# Patient Record
Sex: MALE
[De-identification: names, ages, dates, MRNs, and addresses within clinical notes are randomized per-mention and may not be internally consistent; named-entity substitution may affect disease eponyms.]

---

## 2017-01-01 ENCOUNTER — HOSPITAL (OUTPATIENT)
Dept: OTHER | Age: 37
End: 2017-01-01
Attending: NURSE PRACTITIONER

## 2019-01-08 PROCEDURE — 80307 DRUG TEST PRSMV CHEM ANLYZR: CPT | Performed by: INTERNAL MEDICINE

## 2019-01-08 PROCEDURE — 80353 DRUG SCREENING COCAINE: CPT | Performed by: INTERNAL MEDICINE

## 2019-06-13 PROBLEM — N46.11 OLIGOSPERMIA: Status: ACTIVE | Noted: 2018-06-26

## 2019-06-13 PROBLEM — K21.00 GERD WITH ESOPHAGITIS: Status: ACTIVE | Noted: 2019-06-13

## 2019-06-13 PROBLEM — E29.1 TESTICULAR FAILURE: Status: ACTIVE | Noted: 2018-06-26

## 2019-07-11 PROBLEM — Z72.0 TOBACCO USE: Status: ACTIVE | Noted: 2019-07-11

## 2019-11-18 ENCOUNTER — HOSPITAL ENCOUNTER (OUTPATIENT)
Age: 39
Discharge: HOME | End: 2019-11-18
Attending: EMERGENCY MEDICINE
Payer: COMMERCIAL

## 2019-11-18 VITALS
HEIGHT: 73 IN | TEMPERATURE: 98 F | BODY MASS INDEX: 21.2 KG/M2 | OXYGEN SATURATION: 100 % | HEART RATE: 85 BPM | WEIGHT: 160 LBS | RESPIRATION RATE: 16 BRPM | SYSTOLIC BLOOD PRESSURE: 142 MMHG | DIASTOLIC BLOOD PRESSURE: 91 MMHG

## 2019-11-18 DIAGNOSIS — E86.0 DEHYDRATION: ICD-10-CM

## 2019-11-18 DIAGNOSIS — R11.10 VOMITING, INTRACTABILITY OF VOMITING NOT SPECIFIED, PRESENCE OF NAUSEA NOT SPECIFIED, UNSPECIFIED VOMITING TYPE: Primary | ICD-10-CM

## 2019-11-18 PROCEDURE — 96361 HYDRATE IV INFUSION ADD-ON: CPT

## 2019-11-18 PROCEDURE — 81002 URINALYSIS NONAUTO W/O SCOPE: CPT

## 2019-11-18 PROCEDURE — 85025 COMPLETE CBC W/AUTO DIFF WBC: CPT | Performed by: EMERGENCY MEDICINE

## 2019-11-18 PROCEDURE — 96374 THER/PROPH/DIAG INJ IV PUSH: CPT

## 2019-11-18 PROCEDURE — 99204 OFFICE O/P NEW MOD 45 MIN: CPT

## 2019-11-18 PROCEDURE — 80047 BASIC METABLC PNL IONIZED CA: CPT

## 2019-11-18 RX ORDER — SODIUM CHLORIDE 9 MG/ML
1000 INJECTION, SOLUTION INTRAVENOUS ONCE
Status: COMPLETED | OUTPATIENT
Start: 2019-11-18 | End: 2019-11-18

## 2019-11-18 RX ORDER — ONDANSETRON 2 MG/ML
4 INJECTION INTRAMUSCULAR; INTRAVENOUS ONCE
Status: COMPLETED | OUTPATIENT
Start: 2019-11-18 | End: 2019-11-18

## 2019-11-18 NOTE — ED PROVIDER NOTES
Patient Seen in: [address]      History   Patient presents with:  Dehydration (metabolic/constitutional)    Stated Complaint: dehydrated    HPI  Patient presents with father.   Patient had a laminectomy discectomy on 11/ • Syphilis (acquired) 12-14    treated with intramuscular penicillin- RPR and FTA were positive              Past Surgical History:   Procedure Laterality Date   • EXCIS LUMBAR DISK,ONE LEVEL     • OTHER SURGICAL HISTORY  4-07    S/P VATS with bleb removal Effort: Pulmonary effort is normal.      Breath sounds: Normal breath sounds. Abdominal:      General: Bowel sounds are normal. There is no distension. Palpations: Abdomen is soft. There is no mass. Tenderness: There is no tenderness.  There After hydration patient feels much better. Ambulatory to bathroom without difficulty. No abdominal pain or nausea. Tolerated 10 ounces Gatorade. No lightheadedness or dizziness.   Abdomen soft and nontender to exam.  Patient now reports having had 3 sep

## 2019-11-18 NOTE — ED INITIAL ASSESSMENT (HPI)
Patient reports he had back surgery no the 11/8. Patient reports he has not been able to keep anything down for a couple of days. Patient reports that he phoned his surgeon and was told to come here for IV fluids.

## 2019-11-19 PROBLEM — Z98.890: Status: ACTIVE | Noted: 2019-11-19

## 2021-03-04 PROBLEM — J93.83 SPONTANEOUS PNEUMOTHORAX: Status: ACTIVE | Noted: 2021-03-04

## 2021-03-24 PROBLEM — E55.9 VITAMIN D DEFICIENCY: Status: ACTIVE | Noted: 2021-03-24

## 2021-07-07 ENCOUNTER — HOSPITAL ENCOUNTER (OUTPATIENT)
Age: 41
Discharge: HOME | End: 2021-07-07
Payer: COMMERCIAL

## 2021-07-07 VITALS
HEART RATE: 86 BPM | DIASTOLIC BLOOD PRESSURE: 99 MMHG | TEMPERATURE: 98 F | RESPIRATION RATE: 16 BRPM | OXYGEN SATURATION: 99 % | SYSTOLIC BLOOD PRESSURE: 138 MMHG

## 2021-07-07 DIAGNOSIS — H66.90 ACUTE OTITIS MEDIA, UNSPECIFIED OTITIS MEDIA TYPE: Primary | ICD-10-CM

## 2021-07-07 PROCEDURE — 99213 OFFICE O/P EST LOW 20 MIN: CPT | Performed by: NURSE PRACTITIONER

## 2021-07-07 RX ORDER — AMOXICILLIN 875 MG/1
875 TABLET, COATED ORAL 2 TIMES DAILY
Qty: 20 TABLET | Refills: 0 | Status: SHIPPED | OUTPATIENT
Start: 2021-07-07 | End: 2021-07-17

## 2021-07-07 NOTE — ED PROVIDER NOTES
Patient Seen in: [facility]    History   Patient presents with:  Ear Problem Pain    Stated Complaint: ear ache    HPI    67-year-old male with no significant history here with complaints of left ear pain that started over the last couple of Years since quittin.5      Smokeless tobacco: Never Used      Tobacco comment: most 1.5 packs a day, averaged 2 packs a week     Alcohol use:  Yes      Alcohol/week: 5.0 standard drinks      Types: 5 Standard drinks or equivalent per week      Comme coronavirus 19.   [employer] [place] has issued an official mandate providing medical malpractice immunity and civil liability immunity to office issues of treating patients during the pandemic  MDM         I have personally  reviewed available prior medical 0

## 2022-07-22 ENCOUNTER — HOSPITAL ENCOUNTER (OUTPATIENT)
Age: 42
Discharge: HOME | End: 2022-07-22
Payer: COMMERCIAL

## 2022-07-22 VITALS
RESPIRATION RATE: 18 BRPM | HEART RATE: 60 BPM | OXYGEN SATURATION: 100 % | DIASTOLIC BLOOD PRESSURE: 90 MMHG | SYSTOLIC BLOOD PRESSURE: 137 MMHG | TEMPERATURE: 99 F

## 2022-07-22 DIAGNOSIS — R11.2 NAUSEA AND VOMITING IN ADULT: Primary | ICD-10-CM

## 2022-07-22 LAB
#MXD IC: 1.3 X10ˆ3/UL (ref 0.1–1)
BUN BLD-MCNC: 5 MG/DL (ref 7–18)
CHLORIDE BLD-SCNC: 99 MMOL/L (ref 98–112)
CO2 BLD-SCNC: 25 MMOL/L (ref 21–32)
CREAT BLD-MCNC: 0.9 MG/DL
GLUCOSE BLD-MCNC: 114 MG/DL (ref 70–99)
HCT VFR BLD AUTO: 40.3 %
HCT VFR BLD CALC: 44 %
HGB BLD-MCNC: 14 G/DL
ISTAT IONIZED CALCIUM FOR CHEM 8: 1.2 MMOL/L (ref 1.12–1.32)
LYMPHOCYTES # BLD AUTO: 1.5 X10ˆ3/UL (ref 1–4)
LYMPHOCYTES NFR BLD AUTO: 10.8 %
MCH RBC QN AUTO: 32.8 PG (ref 26–34)
MCHC RBC AUTO-ENTMCNC: 34.7 G/DL (ref 31–37)
MCV RBC AUTO: 94.4 FL (ref 80–100)
MIXED CELL %: 9.6 %
NEUTROPHILS # BLD AUTO: 10.7 X10ˆ3/UL (ref 1.5–7.7)
NEUTROPHILS NFR BLD AUTO: 79.6 %
PLATELET # BLD AUTO: 259 X10ˆ3/UL (ref 150–450)
POTASSIUM BLD-SCNC: 3.4 MMOL/L (ref 3.6–5.1)
RBC # BLD AUTO: 4.27 X10ˆ6/UL
SODIUM BLD-SCNC: 137 MMOL/L (ref 136–145)
WBC # BLD AUTO: 13.5 X10ˆ3/UL (ref 4–11)

## 2022-07-22 PROCEDURE — 99203 OFFICE O/P NEW LOW 30 MIN: CPT | Performed by: PHYSICIAN ASSISTANT

## 2022-07-22 PROCEDURE — 96374 THER/PROPH/DIAG INJ IV PUSH: CPT | Performed by: PHYSICIAN ASSISTANT

## 2022-07-22 PROCEDURE — 85025 COMPLETE CBC W/AUTO DIFF WBC: CPT | Performed by: PHYSICIAN ASSISTANT

## 2022-07-22 PROCEDURE — 80047 BASIC METABLC PNL IONIZED CA: CPT | Performed by: PHYSICIAN ASSISTANT

## 2022-07-22 RX ORDER — 0.9 % SODIUM CHLORIDE 0.9 %
1000 INTRAVENOUS SOLUTION INTRAVENOUS ONCE
Status: COMPLETED | OUTPATIENT
Start: 2022-07-22 | End: 2022-07-22

## 2022-07-22 RX ORDER — ONDANSETRON 4 MG/1
4 TABLET, ORALLY DISINTEGRATING ORAL EVERY 4 HOURS PRN
Qty: 10 TABLET | Refills: 0 | Status: SHIPPED | OUTPATIENT
Start: 2022-07-22 | End: 2022-07-29

## 2022-07-22 RX ORDER — ONDANSETRON 2 MG/ML
4 INJECTION INTRAMUSCULAR; INTRAVENOUS ONCE
Status: COMPLETED | OUTPATIENT
Start: 2022-07-22 | End: 2022-07-22

## 2022-11-11 ENCOUNTER — HOSPITAL ENCOUNTER (OUTPATIENT)
Age: 42
Discharge: HOME | End: 2022-11-11
Payer: COMMERCIAL

## 2022-11-11 VITALS
SYSTOLIC BLOOD PRESSURE: 139 MMHG | OXYGEN SATURATION: 98 % | DIASTOLIC BLOOD PRESSURE: 94 MMHG | HEART RATE: 88 BPM | WEIGHT: 160 LBS | HEIGHT: 73 IN | BODY MASS INDEX: 21.2 KG/M2 | RESPIRATION RATE: 16 BRPM | TEMPERATURE: 98 F

## 2022-11-11 DIAGNOSIS — R19.7 NAUSEA VOMITING AND DIARRHEA: Primary | ICD-10-CM

## 2022-11-11 DIAGNOSIS — R11.2 NAUSEA VOMITING AND DIARRHEA: Primary | ICD-10-CM

## 2022-11-11 LAB
#MXD IC: 1 X10ˆ3/UL (ref 0.1–1)
BUN BLD-MCNC: 6 MG/DL (ref 7–18)
CHLORIDE BLD-SCNC: 101 MMOL/L (ref 98–112)
CO2 BLD-SCNC: 27 MMOL/L (ref 21–32)
CREAT BLD-MCNC: 1.6 MG/DL
GFR SERPLBLD BASED ON 1.73 SQ M-ARVRAT: 55 ML/MIN/1.73M2 (ref 60–?)
GLUCOSE BLD-MCNC: 120 MG/DL (ref 70–99)
HCT VFR BLD AUTO: 43 %
HCT VFR BLD CALC: 50 %
HGB BLD-MCNC: 14.5 G/DL
ISTAT IONIZED CALCIUM FOR CHEM 8: 1.05 MMOL/L (ref 1.12–1.32)
LYMPHOCYTES # BLD AUTO: 1.3 X10ˆ3/UL (ref 1–4)
LYMPHOCYTES NFR BLD AUTO: 22.1 %
MCH RBC QN AUTO: 32.3 PG (ref 26–34)
MCHC RBC AUTO-ENTMCNC: 33.7 G/DL (ref 31–37)
MCV RBC AUTO: 95.8 FL (ref 80–100)
MIXED CELL %: 17.4 %
NEUTROPHILS # BLD AUTO: 3.5 X10ˆ3/UL (ref 1.5–7.7)
NEUTROPHILS NFR BLD AUTO: 60.5 %
PLATELET # BLD AUTO: 191 X10ˆ3/UL (ref 150–450)
POTASSIUM BLD-SCNC: 3.2 MMOL/L (ref 3.6–5.1)
RBC # BLD AUTO: 4.49 X10ˆ6/UL
SODIUM BLD-SCNC: 140 MMOL/L (ref 136–145)
WBC # BLD AUTO: 5.8 X10ˆ3/UL (ref 4–11)

## 2022-11-11 RX ORDER — ONDANSETRON 2 MG/ML
4 INJECTION INTRAMUSCULAR; INTRAVENOUS ONCE
Status: COMPLETED | OUTPATIENT
Start: 2022-11-11 | End: 2022-11-11

## 2022-11-11 RX ORDER — POTASSIUM CHLORIDE 20 MEQ/1
40 TABLET, EXTENDED RELEASE ORAL ONCE
Status: DISCONTINUED | OUTPATIENT
Start: 2022-11-11 | End: 2022-11-11

## 2022-11-11 RX ORDER — POTASSIUM CHLORIDE 20 MEQ/1
40 TABLET, EXTENDED RELEASE ORAL DAILY
Status: DISCONTINUED | OUTPATIENT
Start: 2022-11-11 | End: 2022-11-11

## 2022-11-11 RX ORDER — ONDANSETRON 4 MG/1
4 TABLET, ORALLY DISINTEGRATING ORAL EVERY 8 HOURS PRN
Qty: 10 TABLET | Refills: 0 | Status: SHIPPED | OUTPATIENT
Start: 2022-11-11 | End: 2022-11-18

## 2022-11-11 RX ORDER — SODIUM CHLORIDE 9 MG/ML
1000 INJECTION, SOLUTION INTRAVENOUS ONCE
Status: COMPLETED | OUTPATIENT
Start: 2022-11-11 | End: 2022-11-11

## 2022-11-11 NOTE — ED INITIAL ASSESSMENT (HPI)
LILIANA Vasquez at bedside assessing. Patient here for evaluation of a headache that started 11/8, dizziness and lightheadedness started 11/9, and yesterday started with N/V/D. Denies any CP, SOB/VIANNEY, abdominal pain, etc. Last time he was able to keep any solids or liquids down was on 11/8.

## 2022-11-11 NOTE — DISCHARGE INSTRUCTIONS
Drink plenty of fluids. Rest.  Avoid dairy products for the next couple days. Make a close follow-up appoint with your doctor, you should have your kidney function rechecked with your feeling better. For any worsening symptoms, go to the nearest emergency department for further evaluation.

## 2022-11-11 NOTE — ED QUICK NOTES
Patient tolerated the claudio grahams, gatorade, and potassium without any problems. States feels better after fluids and denies any further nausea.

## 2024-06-25 ENCOUNTER — HOSPITAL ENCOUNTER (OUTPATIENT)
Age: 44
Discharge: HOME | End: 2024-06-25

## 2024-06-25 VITALS
HEART RATE: 97 BPM | DIASTOLIC BLOOD PRESSURE: 87 MMHG | TEMPERATURE: 98 F | SYSTOLIC BLOOD PRESSURE: 113 MMHG | OXYGEN SATURATION: 97 % | RESPIRATION RATE: 16 BRPM

## 2024-06-25 DIAGNOSIS — T15.91XA FOREIGN BODY OF RIGHT EYE, INITIAL ENCOUNTER: Primary | ICD-10-CM

## 2024-06-25 PROCEDURE — 99213 OFFICE O/P EST LOW 20 MIN: CPT | Performed by: NURSE PRACTITIONER

## 2024-06-25 RX ORDER — HYDROXYZINE PAMOATE 25 MG/1
25 CAPSULE ORAL DAILY PRN
COMMUNITY
Start: 2024-06-10

## 2024-06-25 RX ORDER — TOBRAMYCIN 3 MG/ML
2 SOLUTION/ DROPS OPHTHALMIC EVERY 6 HOURS
Qty: 1 EACH | Refills: 0 | Status: SHIPPED | OUTPATIENT
Start: 2024-06-25 | End: 2024-07-02

## 2024-06-25 RX ORDER — METHYLPHENIDATE HYDROCHLORIDE 18 MG/1
1 TABLET, EXTENDED RELEASE ORAL DAILY
COMMUNITY
Start: 2024-05-31

## 2024-06-25 RX ORDER — GABAPENTIN 300 MG/1
300 CAPSULE ORAL 3 TIMES DAILY
COMMUNITY
Start: 2024-06-04

## 2024-06-25 RX ORDER — AMLODIPINE BESYLATE 5 MG/1
5 TABLET ORAL DAILY
COMMUNITY
Start: 2023-03-13

## 2024-06-25 RX ORDER — TRAZODONE HYDROCHLORIDE 100 MG/1
100 TABLET ORAL NIGHTLY
COMMUNITY
Start: 2024-04-10

## 2024-06-25 RX ORDER — ACAMPROSATE CALCIUM 333 MG/1
666 TABLET, DELAYED RELEASE ORAL 3 TIMES DAILY
COMMUNITY

## 2024-06-25 NOTE — DISCHARGE INSTRUCTIONS
Avoid wearing contact lenses until you follow-up with your ophthalmologist or the ophthalmologist referred to you.  Use the eyedrops as prescribed.  Return for any concerns.
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
## 2024-08-10 ENCOUNTER — HOSPITAL ENCOUNTER (OUTPATIENT)
Age: 44
Discharge: HOME | End: 2024-08-10
Payer: COMMERCIAL

## 2024-08-10 VITALS
WEIGHT: 165 LBS | TEMPERATURE: 99 F | DIASTOLIC BLOOD PRESSURE: 102 MMHG | OXYGEN SATURATION: 98 % | RESPIRATION RATE: 18 BRPM | HEIGHT: 73 IN | BODY MASS INDEX: 21.87 KG/M2 | HEART RATE: 110 BPM | SYSTOLIC BLOOD PRESSURE: 147 MMHG

## 2024-08-10 DIAGNOSIS — R03.0 ELEVATED BLOOD PRESSURE READING: ICD-10-CM

## 2024-08-10 DIAGNOSIS — J01.90 ACUTE NON-RECURRENT SINUSITIS, UNSPECIFIED LOCATION: Primary | ICD-10-CM

## 2024-08-10 LAB
#MXD IC: 1.4 X10ˆ3/UL (ref 0.1–1)
BASOPHILS # BLD AUTO: 0.08 X10(3) UL (ref 0–0.2)
BASOPHILS NFR BLD AUTO: 0.8 %
BUN BLD-MCNC: 13 MG/DL (ref 7–18)
CHLORIDE BLD-SCNC: 95 MMOL/L (ref 98–112)
CO2 BLD-SCNC: 28 MMOL/L (ref 21–32)
CREAT BLD-MCNC: 1.3 MG/DL
DEPRECATED RDW RBC AUTO: 51 FL (ref 35.1–46.3)
EGFRCR SERPLBLD CKD-EPI 2021: 70 ML/MIN/1.73M2 (ref 60–?)
EOSINOPHIL # BLD AUTO: 0.48 X10(3) UL (ref 0–0.7)
EOSINOPHIL NFR BLD AUTO: 5 %
ERYTHROCYTE [DISTWIDTH] IN BLOOD BY AUTOMATED COUNT: 14.4 % (ref 11–15)
GLUCOSE BLD-MCNC: 112 MG/DL (ref 70–99)
HCT VFR BLD AUTO: 47 %
HCT VFR BLD AUTO: 47.9 %
HCT VFR BLD CALC: 52 %
HGB BLD-MCNC: 15.6 G/DL
HGB BLD-MCNC: 16 G/DL
IMM GRANULOCYTES # BLD AUTO: 0.03 X10(3) UL (ref 0–1)
IMM GRANULOCYTES NFR BLD: 0.3 %
ISTAT IONIZED CALCIUM FOR CHEM 8: 1.22 MMOL/L (ref 1.12–1.32)
LYMPHOCYTES # BLD AUTO: 1.4 X10ˆ3/UL (ref 1–4)
LYMPHOCYTES # BLD AUTO: 1.42 X10(3) UL (ref 1–4)
LYMPHOCYTES NFR BLD AUTO: 14 %
LYMPHOCYTES NFR BLD AUTO: 14.9 %
MCH RBC QN AUTO: 31.5 PG (ref 26–34)
MCH RBC QN AUTO: 32.9 PG (ref 26–34)
MCHC RBC AUTO-ENTMCNC: 32.6 G/DL (ref 31–37)
MCHC RBC AUTO-ENTMCNC: 34 G/DL (ref 31–37)
MCV RBC AUTO: 96.5 FL
MCV RBC AUTO: 96.8 FL (ref 80–100)
MIXED CELL %: 14.1 %
MONOCYTES # BLD AUTO: 1.26 X10(3) UL (ref 0.1–1)
MONOCYTES NFR BLD AUTO: 13.2 %
NEUTROPHILS # BLD AUTO: 6.27 X10 (3) UL (ref 1.5–7.7)
NEUTROPHILS # BLD AUTO: 6.27 X10(3) UL (ref 1.5–7.7)
NEUTROPHILS # BLD AUTO: 6.9 X10ˆ3/UL (ref 1.5–7.7)
NEUTROPHILS NFR BLD AUTO: 65.8 %
NEUTROPHILS NFR BLD AUTO: 71.9 %
PLATELET # BLD AUTO: 207 10(3)UL (ref 150–450)
POTASSIUM BLD-SCNC: 3.6 MMOL/L (ref 3.6–5.1)
RBC # BLD AUTO: 4.87 X10(6)UL
RBC # BLD AUTO: 4.95 X10ˆ6/UL
SARS-COV-2 RNA RESP QL NAA+PROBE: NOT DETECTED
SODIUM BLD-SCNC: 137 MMOL/L (ref 136–145)
WBC # BLD AUTO: 9.5 X10(3) UL (ref 4–11)
WBC # BLD AUTO: 9.7 X10ˆ3/UL (ref 4–11)

## 2024-08-10 PROCEDURE — 99214 OFFICE O/P EST MOD 30 MIN: CPT | Performed by: NURSE PRACTITIONER

## 2024-08-10 PROCEDURE — 96360 HYDRATION IV INFUSION INIT: CPT | Performed by: NURSE PRACTITIONER

## 2024-08-10 PROCEDURE — 80047 BASIC METABLC PNL IONIZED CA: CPT | Performed by: NURSE PRACTITIONER

## 2024-08-10 PROCEDURE — U0002 COVID-19 LAB TEST NON-CDC: HCPCS | Performed by: NURSE PRACTITIONER

## 2024-08-10 PROCEDURE — 85025 COMPLETE CBC W/AUTO DIFF WBC: CPT | Performed by: NURSE PRACTITIONER

## 2024-08-10 RX ORDER — AMOXICILLIN AND CLAVULANATE POTASSIUM 875; 125 MG/1; MG/1
1 TABLET, FILM COATED ORAL 2 TIMES DAILY
Qty: 14 TABLET | Refills: 0 | Status: SHIPPED | OUTPATIENT
Start: 2024-08-10 | End: 2024-08-17

## 2024-08-10 RX ORDER — SODIUM CHLORIDE 9 MG/ML
1000 INJECTION, SOLUTION INTRAVENOUS ONCE
Status: COMPLETED | OUTPATIENT
Start: 2024-08-10 | End: 2024-08-10

## 2024-08-10 NOTE — ED INITIAL ASSESSMENT (HPI)
Pt presents to the IC with c/o sinus congestion and facial pressure with emesis following post nasal drip since Tuesday. Pt is seeing his ENT in Sept.

## 2024-08-10 NOTE — ED QUICK NOTES
700cc  NS infused. Pt requesting to leave as he wants to be home with his kids. IV discontinued.

## 2024-08-10 NOTE — ED PROVIDER NOTES
Patient Seen in: [facility]    History   CC: sinus pressure  HPI: Angel Brown 44 year old male w/ HTN, Asthma, Allergic Rhinitis, RICKEY who presents c/o sinus pressure/pain which first began 8/6/2024. Hx of sinus issues, recurrent sinusitis however has not had a sinus infection in a year since having sinus surgery. States he gets concerned however waiting too long for tx as his wife became bacteriemic from an untreated sinusitis. Advises copious pnd which has been causing him to vomit. States he has been unable to tolerate any PO x3 days. Denies abd pain, diarrhea, rash, fever, cp, maryam, difficulty swallowing/drooling. Is requesting IVF.      Past Medical History:    ALLERGIC RHINITIS    ASTHMA    Asthma (HCC)    Erectile dysfunction, unspecified erectile dysfunction type    Essential hypertension    GERD    History of hemilaminectomy    HOSPITALIZATIONS    Spontaneous pneumothorax x 2    OTHER DISEASES    Pulmonary Langerhan's histiocytosis    OTHER DISEASES    Insomnia    Spontaneous pneumothorax    Syphilis (acquired)    treated with intramuscular penicillin- RPR and FTA were positive    Vitamin D deficiency       Past Surgical History:   Procedure Laterality Date    Excis lumbar disk,one level      Other surgical history  4-07    S/P VATS with bleb removal       Family History   Problem Relation Age of Onset    Lipids Father     Hypertension Father     Hypertension Mother        Social History     Socioeconomic History    Marital status:     Number of children: 1   Occupational History    Occupation: Financial management   Tobacco Use    Smoking status: Former     Current packs/day: 0.00     Types: Cigarettes, Cigars     Start date: 1/1/2000     Quit date: 1/1/2014     Years since quitting: 10.6    Smokeless tobacco: Never    Tobacco comments:     most 1.5 packs a day, averaged 2 packs a week    Vaping Use    Vaping status: Never Used   Substance and Sexual Activity    Alcohol use: Not  Currently     Alcohol/week: 5.0 standard drinks of alcohol     Types: 5 Standard drinks or equivalent per week     Comment: occas    Drug use: Yes     Types: Cannabis     Comment: occ       ROS:  Review of Systems    Positive for stated complaint: congestion,vomiting  Other systems are as noted in HPI.  Constitutional and vital signs reviewed.      All other systems reviewed and negative except as noted above.    PSFH elements reviewed from today and agreed except as otherwise stated in HPI.             Constitutional and vital signs reviewed.        Physical Exam     ED Triage Vitals   BP 08/10/24 0811 (!) 147/102   Pulse 08/10/24 0811 110   Resp 08/10/24 0807 18   Temp 08/10/24 0811 98.9 °F (37.2 °C)   Temp src 08/10/24 0807 Temporal   SpO2 08/10/24 0811 98 %   O2 Device 08/10/24 0811 None (Room air)       Current:BP (!) 147/102   Pulse 110   Temp 98.9 °F (37.2 °C) (Oral)   Resp 18   Ht 185.4 cm (6' 1\")   Wt 74.8 kg   SpO2 98%   BMI 21.77 kg/m²         PE:  General - Appears well, non-toxic and in NAD  Head - Appears symmetrical without deformity/swelling cranium, scalp, or facial bones  Eyes - sclera not injected, no discharge noted, no periorbital edema  ENT - EAC bilaterally without discharge, TM pearly grey with COL visualized appropriately bilaterally.   no nasal drainage noted in nares bilat, no cobblestoning to post. Pharynx.   Oropharynx clear, posterior pharynx is without erythema and without tonsilar enlargement or exudate, uvula midline, +gag, voice is clear. No trismus  Neck - no significant adenopathy, supple with trachea midline  Resp - Lung sounds clear bilaterally and wob unlabored, good aeration with equal, even expansion bilaterally   CV - RRR  Skin - no rashes or petechiae noted, pink warm and dry throughout, mmm, cap refill <2seconds  Neuro - A&O x4, steady gait  MSK - makes purposeful movements of all extremities, radial pulses 2+ bilat.  Psych - Interactive and appropriate      ED  Course     Labs Reviewed   POCT CBC - Abnormal; Notable for the following components:       Result Value    # Mixed Cells 1.4 (*)     All other components within normal limits   POCT ISTAT CHEM8 CARTRIDGE - Abnormal; Notable for the following components:    ISTAT Chloride 95 (*)     ISTAT Glucose 112 (*)     All other components within normal limits   RAPID SARS-COV-2 BY PCR - Normal   CBC W AUTO DIFF       MDM     DDx: covid 19, unspecified viral illness, allergic rhinitis, viral v bacterial sinusitis     Patient advises he has not been able to hold down any p.o. fluids or solids due to postnasal drip inducing vomiting.  Patient denies abdominal pain.  No abdominal tenderness on exam.  He is requesting IV fluids.  CBC and chemistry with glucose 112 however patient has been drinking Gatorade this morning he states somewhat successfully and chloride 95.  1 L IV fluids given.  Rapid COVID PCR negative.  Advised hydration instructions, sinus rinses, Flonase nasal spray, rest, close follow-up and return/ED precautions reviewed.  Patient states some improvement after IV fluids in the immediate care.  He has been taking Sudafed and has not taken his blood pressure medication this morning.  He is aware his blood pressure is elevated at the immediate care and will take his blood pressure medicine when he gets home. Patient is historian and demonstrates understanding of all instruction and agrees with plan of care.    Patient's blood pressure elevated at immediate care.  Nonsymptomatic of high blood pressure.  Red flags reviewed with patient as well as close follow-up instructions.  Pt demonstrates understanding of instruction and agrees with plan of care.    Disposition and Plan     Clinical Impression:  1. Acute non-recurrent sinusitis, unspecified location    2. Elevated blood pressure reading        Disposition:  Discharge    Follow-up:  Livan Cleaning MD  [address] [facility] [address]  [facility]  [id number]  [phone]    Go in 1 week  As needed      Medications Prescribed:  Discharge Medication List as of 8/10/2024  9:30 AM        START taking these medications    Details   amoxicillin clavulanate 875-125 MG Oral Tab Take 1 tablet by mouth 2 (two) times daily for 7 days., Normal, Disp-14 tablet, R-0

## 2025-04-28 ENCOUNTER — HOSPITAL ENCOUNTER (OUTPATIENT)
Age: 45
Discharge: ANOTHER HOSPITAL | End: 2025-04-28
Payer: COMMERCIAL

## 2025-04-28 VITALS
TEMPERATURE: 99 F | HEART RATE: 117 BPM | OXYGEN SATURATION: 100 % | SYSTOLIC BLOOD PRESSURE: 83 MMHG | RESPIRATION RATE: 22 BRPM | DIASTOLIC BLOOD PRESSURE: 53 MMHG

## 2025-04-28 DIAGNOSIS — R11.2 NAUSEA VOMITING AND DIARRHEA: ICD-10-CM

## 2025-04-28 DIAGNOSIS — R19.7 NAUSEA VOMITING AND DIARRHEA: ICD-10-CM

## 2025-04-28 DIAGNOSIS — I95.9 HYPOTENSION, UNSPECIFIED HYPOTENSION TYPE: Primary | ICD-10-CM

## 2025-04-28 PROCEDURE — 99213 OFFICE O/P EST LOW 20 MIN: CPT | Performed by: NURSE PRACTITIONER

## 2025-04-28 NOTE — ED INITIAL ASSESSMENT (HPI)
Patient is here with vomiting and diarrhea for the last three days. He denies any abdominal pain.  He states he is having extreme cramping in his legs and arms.  He is having dizziness.

## 2025-04-28 NOTE — ED PROVIDER NOTES
Patient Seen in: [facility]      History   No chief complaint on file.    Stated Complaint: VOMITING,DIARRHEA    Subjective:   HPI    45yo male p/w n/v/d x 3 days. Went to an outside ER 2025, dx'd w/gastroenteritis. Given rx for zofran has been taking by mouth.  States he was told to come back on 25 for IVF and to get his blood rechecked, but the clinic was closed. Came to our IC today bc it opens and hour earlier than his regular clinic.     Reports inability to tolerate po, continued diarrhea and dizziness while in the shower this am.     VSS in IC today is 83/53 HR is 117.     Drove self to IC today.   History of Present Illness               Objective:     Past Medical History:    ALLERGIC RHINITIS    ASTHMA    Asthma (HCC)    Erectile dysfunction, unspecified erectile dysfunction type    Essential hypertension    GERD    History of hemilaminectomy    HOSPITALIZATIONS    Spontaneous pneumothorax x 2    OTHER DISEASES    Pulmonary Langerhan's histiocytosis    OTHER DISEASES    Insomnia    Spontaneous pneumothorax    Syphilis (acquired)    treated with intramuscular penicillin- RPR and FTA were positive    Vitamin D deficiency              Past Surgical History:   Procedure Laterality Date    Excis lumbar disk,one level      Other surgical history      S/P VATS with bleb removal                Social History     Socioeconomic History    Marital status:     Number of children: 1   Occupational History    Occupation: Financial management   Tobacco Use    Smoking status: Former     Current packs/day: 0.00     Types: Cigarettes, Cigars     Start date: 2000     Quit date: 2014     Years since quittin.3    Smokeless tobacco: Never    Tobacco comments:     most 1.5 packs a day, averaged 2 packs a week    Vaping Use    Vaping status: Never Used   Substance and Sexual Activity    Alcohol use: Not Currently     Alcohol/week: 5.0 standard drinks of alcohol     Types: 5  Standard drinks or equivalent per week     Comment: occas    Drug use: Yes     Types: Cannabis     Comment: occ              Review of Systems    Positive for stated complaint: VOMITING,DIARRHEA  Other systems are as noted in HPI.  Constitutional and vital signs reviewed.      All other systems reviewed and negative except as noted above.                  Physical Exam     ED Triage Vitals [04/28/25 0843]   BP (!) 83/53   Pulse 117   Resp 22   Temp 98.9 °F (37.2 °C)   Temp src Oral   SpO2 100 %   O2 Device None (Room air)       Current Vitals:   Vital Signs  BP: (!) 83/53 (took twice)  Pulse: 117  Resp: 22  Temp: 98.9 °F (37.2 °C)  Temp src: Oral    Oxygen Therapy  SpO2: 100 %  O2 Device: None (Room air)        Physical Exam  Vitals and nursing note reviewed.   Constitutional:       Appearance: He is ill-appearing.   HENT:      Head: Normocephalic.      Nose: Nose normal.      Mouth/Throat:      Pharynx: Oropharynx is clear.   Cardiovascular:      Rate and Rhythm: Tachycardia present.   Pulmonary:      Effort: Pulmonary effort is normal. No respiratory distress.   Musculoskeletal:         General: Normal range of motion.      Cervical back: Normal range of motion.   Skin:     General: Skin is warm and dry.   Neurological:      Mental Status: He is alert.           Physical Exam                ED Course   Labs Reviewed - No data to display       Results                                 MDM             Medical Decision Making  43yo male sig pmh pancreatitis, etoh abuse, p/w n/v/d. Denies abdominal pain.     Given the limitations of the immediate care and the likely need for advanced lab testing and/or imaging not available here the patient will be sent to the emergency department for further evaluation and management.    Differential diagnosis reflecting the complexity of care include: abdominal pain, hypotension, acute kidney injury, pancreatitis, sepsis, dehydration    Comorbidities that add complexity to management  include:pancreatitis    External chart review was done and was noted:Yes    History obtained by an independent source was from: Patient    Discussions of management was done with:Patient    My independent interpretation of studies of:na    Diagnostic tests and medications considered but not ordered were:IVF, IV, labs    Social determinants of health that affect care:NA    Shared decision making was done by Self, Patient            Disposition and Plan     Clinical Impression:  1. Hypotension, unspecified hypotension type    2. Nausea vomiting and diarrhea         Disposition:  Ic to ed  4/28/2025  8:54 am    Follow-up:  No follow-up provider specified.        Medications Prescribed:  Discharge Medication List as of 4/28/2025  8:54 AM          Supplementary Documentation:

## 2025-04-28 NOTE — ED QUICK NOTES
Patient was discharged to [facility] for further evaluation of his symptoms.  Patient went by private vehicle.  Patient declined EMS.